# Patient Record
Sex: MALE
[De-identification: names, ages, dates, MRNs, and addresses within clinical notes are randomized per-mention and may not be internally consistent; named-entity substitution may affect disease eponyms.]

---

## 2019-03-24 ENCOUNTER — HOSPITAL ENCOUNTER (EMERGENCY)
Dept: HOSPITAL 97 - ER | Age: 34
Discharge: HOME | End: 2019-03-24
Payer: SELF-PAY

## 2019-03-24 DIAGNOSIS — H53.8: Primary | ICD-10-CM

## 2019-03-24 DIAGNOSIS — Z72.0: ICD-10-CM

## 2019-03-24 PROCEDURE — 70450 CT HEAD/BRAIN W/O DYE: CPT

## 2019-03-24 PROCEDURE — 99283 EMERGENCY DEPT VISIT LOW MDM: CPT

## 2019-03-24 NOTE — ER
Nurse's Notes                                                                                     

 [facility]                                                                 

Name: Navi Cotton                                                                              

Age: 34 yrs                                                                                       

Sex: Male                                                                                         

: 1985                                                                                   

MRN: [id number]                                                                                   

Arrival Date: 2019                                                                          

Time: 10:47                                                                                       

Account#: [id number]                                                                            

Bed Treatment                                                                                     

Private MD: None, None                                                                            

Diagnosis: Visual disturbances                                                                    

                                                                                                  

Presentation:                                                                                     

                                                                                             

11:04 Presenting complaint: Patient states: the night before last, i noticed half of my       hj  

      vision the R eye is covered with something, and its getting worse, my vision is limited     

      on my R eye; denies trauma to the area;. Transition of care: patient was not received       

      from another setting of care. Onset of symptoms was 2019. Risk Assessment: Do     

      you want to hurt yourself or someone else? Patient reports no desire to harm self or        

      others. Initial Sepsis Screen: Does the patient meet any 2 criteria? No. Patient's          

      initial sepsis screen is negative. Does the patient have a suspected source of              

      infection? No. Patient's initial sepsis screen is negative. Care prior to arrival: None.    

11:04 Method Of Arrival: Ambulatory                                                             

11:04 Acuity: VICKIE 4                                                                           hj  

                                                                                                  

Triage Assessment:                                                                                

11:07 General: Appears in no apparent distress. uncomfortable, Behavior is calm, cooperative, hj  

      appropriate for age. Pain: Denies pain.                                                     

                                                                                                  

Historical:                                                                                       

- Allergies:                                                                                      

11:06 Ibuprofen;                                                                              hj  

- Home Meds:                                                                                      

11:06 None [Active];                                                                          hj  

- PMHx:                                                                                           

11:06 None;                                                                                   hj  

- PSHx:                                                                                           

11:06 Appendectomy;                                                                           hj  

                                                                                                  

- Immunization history:: Adult Immunizations not up to date.                                      

- Social history:: Smoking status: Patient uses tobacco products, Patient/guardian                

  denies using alcohol.                                                                           

- Ebola Screening: : Patient negative for fever greater than or equal to 101.5 degrees            

  Fahrenheit, and additional compatible Ebola Virus Disease symptoms Patient denies               

  exposure to infectious person Patient denies travel to an Ebola-affected area in the            

  21 days before illness onset.                                                                   

                                                                                                  

                                                                                                  

Screenin:07 Abuse screen: Denies threats or abuse. Denies injuries from another. Nutritional        hj  

      screening: No deficits noted. Tuberculosis screening: No symptoms or risk factors           

      identified. Fall Risk None identified.                                                      

                                                                                                  

Assessment:                                                                                       

11:45 General: Appears in no apparent distress. comfortable, Behavior is calm, cooperative,   ss  

      Denies fever, feeling ill, fatigue, chills. Pain: Denies pain. Neuro: Level of              

      Consciousness is awake, alert, obeys commands, Oriented to person, place, time,             

      situation. Cardiovascular: Capillary refill < 3 seconds is brisk in bilateral fingers       

      Patient's skin is warm and dry. Respiratory: Airway is patent Respiratory effort is         

      even, unlabored, Respiratory pattern is regular, symmetrical. GI: No signs and/or           

      symptoms were reported involving the gastrointestinal system. : No signs and/or           

      symptoms were reported regarding the genitourinary system. EENT: Sclera/Cornea Nares        

      are clear Oral mucosa is moist. Throat is clear. EENT: Reports small black spot of          

      vision to R lower quadrant of visual field. Began 2 nights ago. . Derm: Skin is intact,     

      is healthy with good turgor, Skin is dry, Skin is pink, warm \T\ dry. normal.               

      Musculoskeletal: Circulation, motion, and sensation intact. Range of motion: intact in      

      all extremities.                                                                            

                                                                                                  

Vital Signs:                                                                                      

11:07  / 57; Pulse 64; Resp 18; Temp 98.1(O); Pulse Ox 100% on R/A; Weight 127.01 kg;   hj  

      Height 6 ft. 0 in. (182.88 cm); Pain 0/10;                                                  

11:07 Body Mass Index 37.98 (127.01 kg, 182.88 cm)                                            hj  

                                                                                                  

Visual Acuity:                                                                                    

12:01 Left Eye Visual acuity 20/20, Normal, React To Light, Reactive To Accomodation; Right   ss  

      Eye Visual acuity 20/20, Normal, React To Light, Reactive To Accomodation; Both Eyes        

      Visual acuity 20/20; With Lenses; Pt reports a small area of darkness in R lower            

      quadrant of visual field.                                                                   

                                                                                                  

ED Course:                                                                                        

10:47 Patient arrived in ED.                                                                  mr  

10:48 None, None is Private Physician.                                                        mr  

11:06 Triage completed.                                                                       hj  

11:07 Arm band placed on right wrist.                                                         hj  

11:07 Patient has correct armband on for positive identification. Placed in gown. Bed in low  hj  

      position. Call light in reach. Side rails up X 1.                                           

11:49 Alfonso Santana PA is PHCP.                                                                cp  

11:49 Alfonso Bauman MD is Attending Physician.                                             cp  

12:01 Haley Schultz, JOSEFA is Primary Nurse.                                                    ss  

12:18 CT Head Brain wo Cont In Process Unspecified.                                           EDMS

12:47 Cortez Fernandez MD is Referral Physician.                                              cp  

12:47 No provider procedures requiring assistance completed. Patient did not have IV access   ss  

      during this emergency room visit.                                                           

                                                                                                  

Administered Medications:                                                                         

No medications were administered                                                                  

                                                                                                  

                                                                                                  

Outcome:                                                                                          

12:48 Discharge ordered by MD.                                                                cp  

12:58 Discharged to home ambulatory.                                                          ss  

12:58 Condition: good                                                                             

12:58 Discharge instructions given to patient, Instructed on discharge instructions, follow       

      up and referral plans. medication usage, Demonstrated understanding of instructions,        

      follow-up care, medications.                                                                

12:58 Patient left the ED.                                                                    ss  

                                                                                                  

Signatures:                                                                                       

Dispatcher [employer]                           EDMS                                                 

Joana Alanis                                                   

Haley Schultz, RN                      RN                                                      

Kareem Bowen RN                      RN   hj                                                   

Alfonso Santana, PA                         PA   cp                                                   

                                                                                                  

Corrections: (The following items were deleted from the chart)                                    

11:10 11:07 Pulse 64bpm; Resp 18bpm; Pulse Ox 100% RA; Temp 98.1F Oral; 127.01 kg; Height 6   hj  

      ft. 0 in.; BMI: 37.9; Pain 0/10; hj                                                         

                                                                                                  

**************************************************************************************************

## 2019-03-24 NOTE — EDPHYS
Physician Documentation                                                                           

 [facility]                                                                 

Name: Navi Cottno                                                                              

Age: 34 yrs                                                                                       

Sex: Male                                                                                         

: 1985                                                                                   

MRN: [id number]                                                                                   

Arrival Date: 2019                                                                          

Time: 10:47                                                                                       

Account#: [id number]                                                                            

Bed Treatment                                                                                     

Private MD: None, None                                                                            

ED Physician Alfonso Bauman                                                                      

HPI:                                                                                              

                                                                                             

12:05 This 34 yrs old  Male presents to ER via Ambulatory with complaints of Vision   cp  

      Problem.                                                                                    

12:05 The patient is experiencing dark brown spot in visual field, to the right eye, caused   cp  

      by an unknown mechanism. Onset: The symptoms/episode began/occurred 2 day(s) ago.           

      Duration: the symptoms are continuous. Associated signs and symptoms: Pertinent             

      positives: headache last night that is now resolved, Pertinent negatives: dizziness,        

      ear ache, fever. Patient wears glasses. Severity of symptoms: in the emergency              

      department the symptoms are unchanged despite home interventions.                           

                                                                                                  

Historical:                                                                                       

- Allergies:                                                                                      

11:06 Ibuprofen;                                                                              hj  

- Home Meds:                                                                                      

11:06 None [Active];                                                                          hj  

- PMHx:                                                                                           

11:06 None;                                                                                   hj  

- PSHx:                                                                                           

11:06 Appendectomy;                                                                           hj  

                                                                                                  

- Immunization history:: Adult Immunizations not up to date.                                      

- Social history:: Smoking status: Patient uses tobacco products, Patient/guardian                

  denies using alcohol.                                                                           

- Ebola Screening: : Patient negative for fever greater than or equal to 101.5 degrees            

  Fahrenheit, and additional compatible Ebola Virus Disease symptoms Patient denies               

  exposure to infectious person Patient denies travel to an Ebola-affected area in the            

  21 days before illness onset.                                                                   

                                                                                                  

                                                                                                  

ROS:                                                                                              

12:12 Constitutional: Negative for body aches, chills, fever, poor PO intake.                 cp  

12:12 Eyes: Positive for visual disturbance, of the right eye, Negative for discharge, pain,  cp  

      redness.                                                                                    

12:12 ENT: Negative for drainage from ear(s), ear pain, sore throat, difficulty swallowing,       

      difficulty handling secretions.                                                             

12:12 Cardiovascular: Negative for chest pain, palpitations.                                      

12:12 Respiratory: Negative for cough, shortness of breath, wheezing.                             

12:12 Abdomen/GI: Negative for abdominal pain, nausea, vomiting, and diarrhea.                    

12:12 : Negative for urinary symptoms.                                                          

12:12 Skin: Negative for cellulitis, rash.                                                        

12:12 Neuro: Negative for altered mental status, dizziness, headache, weakness.                   

12:12 All other systems are negative.                                                             

                                                                                                  

Exam:                                                                                             

12:18 Constitutional: The patient appears in no acute distress, alert, awake, non-toxic, well cp  

      developed, well nourished.                                                                  

12:18 Head/Face:  Normocephalic, atraumatic.                                                  cp  

12:18 Eyes: Periorbital structures: appear normal, Pupils: equal, round, and reactive to          

      light and accomodation, Extraocular movements: intact throughout, Conjunctiva: normal,      

      no exudate, no injection, Corneas: are normal, Sclera: no appreciated abnormality,          

      Anterior chamber: normal, no hyphema, Lids and lashes: appear normal, bilaterally,          

      Visual fields: are intact.                                                                  

12:18 ENT: External ear(s): are unremarkable, Ear canal(s): are normal, clear, TM's: bulging,     

      is not appreciated, bilaterally, dullness, bilaterally, erythema, is not appreciated,       

      bilaterally, Nose: is normal, Mouth: is normal, Posterior pharynx: Airway: no evidence      

      of obstruction, patent.                                                                     

12:18 Neck: ROM/movement: is normal, is supple, without pain, no range of motions                 

      limitations, no nuchal rigidity.                                                            

12:18 Chest/axilla: Inspection: normal.                                                           

12:18 Cardiovascular: Rate: normal, Rhythm: regular.                                              

12:18 Respiratory: the patient does not display signs of respiratory distress,  Respirations:     

      normal, no use of accessory muscles, no retractions, no splinting, no tachypnea.            

12:18 Skin: cellulitis, is not appreciated, no rash present.                                      

12:18 Neuro: Orientation: to person, place \T\ time. Mentation: is normal, Cerebellar function:   

      is grossly normal, Motor: moves all fours, strength is normal, Sensation: is normal,        

      Gait: is steady.                                                                            

                                                                                                  

Vital Signs:                                                                                      

11:07  / 57; Pulse 64; Resp 18; Temp 98.1(O); Pulse Ox 100% on R/A; Weight 127.01 kg;   hj  

      Height 6 ft. 0 in. (182.88 cm); Pain 0/10;                                                  

11:07 Body Mass Index 37.98 (127.01 kg, 182.88 cm)                                              

                                                                                                  

Visual Acuity:                                                                                    

12:01 Left Eye Visual acuity 20/20, Normal, React To Light, Reactive To Accomodation; Right   ss  

      Eye Visual acuity 20/20, Normal, React To Light, Reactive To Accomodation; Both Eyes        

      Visual acuity 20/20; With Lenses; Pt reports a small area of darkness in R lower            

      quadrant of visual field.                                                                   

                                                                                                  

MDM:                                                                                              

11:49 Patient medically screened.                                                             cp  

12:00 Differential diagnosis: Corneal abrasion of right eye. Foreign body in right eye. CVA.  cp  

12:47 Data reviewed: vital signs, nurses notes, radiologic studies, CT scan.                    

12:47 Counseling: I had a detailed discussion with the patient and/or guardian regarding: the cp  

      historical points, exam findings, and any diagnostic results supporting the                 

      discharge/admit diagnosis, radiology results, the need for outpatient follow up, for        

      definitive care, an opthalmologist, to return to the emergency department if symptoms       

      worsen or persist or if there are any questions or concerns that arise at home. ED          

      course: VSS. Head CT negative for acute findings. Will discharge to home and recommend      

      f/u with ophthalmology.                                                                     

                                                                                                  

                                                                                             

11:57 Order name: CT Head Brain wo Cont; Complete Time: 12:44                                   

                                                                                             

11:58 Order name: Visual Acuity; Complete Time: 12:00                                         cp  

                                                                                                  

Administered Medications:                                                                         

No medications were administered                                                                  

                                                                                                  

                                                                                                  

Disposition:                                                                                      

13:30 Chart complete.                                                                           

                                                                                             

07:33 Co-signature as Attending Physician, Alfonso Bauman MD I agree with the assessment and  mateusz 

      plan of care.                                                                               

                                                                                                  

Disposition:                                                                                      

19 12:48 Discharged to Home. Impression: Visual disturbances.                               

- Condition is Stable.                                                                            

- Discharge Instructions: Visual Disturbances, Aspirin and Your Heart.                            

                                                                                                  

- Medication Reconciliation Form, Thank You Letter, Antibiotic Education, Prescription            

  Opioid Use form.                                                                                

- Work release form (19 21:53).                                                         am2 

- Follow up: Cortez Fernandez MD; When: Tomorrow; Reason: Recheck today's complaints.             

- Problem is new.                                                                                 

- Symptoms are unchanged.                                                                         

- Notes: take baby aspirin daily                                                                  

                                                                                                  

                                                                                                  

Signatures:                                                                                       

Dispatcher [employer]                           EDMS                                                 

Alfonso Bauman MD MD cha Smirch, Shelby, RN RN ss Joaquin, Henry, RN RN hj Page, Corey, PA PA                                                      

Kala Pyle                               am2                                                  

                                                                                                  

Corrections: (The following items were deleted from the chart)                                    

                                                                                             

12:58 12:48 2019 12:48 Discharged to Home. Impression: Visual disturbances. Condition   ss  

      is Stable. Forms are Medication Reconciliation Form, Thank You Letter, Antibiotic           

      Education, Prescription Opioid Use. Follow up: Cortez Fernandez; When: Tomorrow; Reason:      

      Recheck today's complaints. Problem is new. Symptoms are unchanged. cp                      

                                                                                                  

**************************************************************************************************

## 2019-03-24 NOTE — RAD REPORT
EXAM DESCRIPTION:  CT - Head Brain Wo Cont - 3/24/2019 12:18 pm

 

CLINICAL HISTORY:  VISUAL DISTURBANCES

Headache, drowsiness

 

COMPARISON:  <Comparisons>

 

TECHNIQUE:  All CT scans are performed using dose optimization technique as appropriate and may inclu
de automated exposure control or mA/KV adjustment according to patient size.

 

FINDINGS:  No intracranial hemorrhage, hydrocephalus or extra-axial fluid collection.No areas of brai
n edema or evidence of midline shift.

 

The paranasal sinuses and mastoids are clear. The calvarium is intact.

 

IMPRESSION:  No acute intracranial abnormality.

## 2021-06-09 ENCOUNTER — HOSPITAL ENCOUNTER (EMERGENCY)
Dept: HOSPITAL 97 - ER | Age: 36
Discharge: HOME | End: 2021-06-09
Payer: SELF-PAY

## 2021-06-09 VITALS — OXYGEN SATURATION: 99 % | TEMPERATURE: 97.5 F

## 2021-06-09 VITALS — SYSTOLIC BLOOD PRESSURE: 135 MMHG | DIASTOLIC BLOOD PRESSURE: 84 MMHG

## 2021-06-09 DIAGNOSIS — B27.90: Primary | ICD-10-CM

## 2021-06-09 LAB
ALBUMIN SERPL BCP-MCNC: 4 G/DL (ref 3.4–5)
ALP SERPL-CCNC: 81 U/L (ref 45–117)
ALT SERPL W P-5'-P-CCNC: 31 U/L (ref 12–78)
AST SERPL W P-5'-P-CCNC: 12 U/L (ref 15–37)
BUN BLD-MCNC: 13 MG/DL (ref 7–18)
GLUCOSE SERPLBLD-MCNC: 91 MG/DL (ref 74–106)
HCT VFR BLD CALC: 46.5 % (ref 39.6–49)
INR BLD: 0.95
LIPASE SERPL-CCNC: 147 U/L (ref 73–393)
LYMPHOCYTES # SPEC AUTO: 2.5 K/UL (ref 0.7–4.9)
METHAMPHET UR QL SCN: NEGATIVE
PMV BLD: 9.8 FL (ref 7.6–11.3)
POTASSIUM SERPL-SCNC: 3.7 MMOL/L (ref 3.5–5.1)
RBC # BLD: 5.22 M/UL (ref 4.33–5.43)
THC SERPL-MCNC: POSITIVE NG/ML

## 2021-06-09 PROCEDURE — 87070 CULTURE OTHR SPECIMN AEROBIC: CPT

## 2021-06-09 PROCEDURE — 87081 CULTURE SCREEN ONLY: CPT

## 2021-06-09 PROCEDURE — 99284 EMERGENCY DEPT VISIT MOD MDM: CPT

## 2021-06-09 PROCEDURE — 96375 TX/PRO/DX INJ NEW DRUG ADDON: CPT

## 2021-06-09 PROCEDURE — 85730 THROMBOPLASTIN TIME PARTIAL: CPT

## 2021-06-09 PROCEDURE — 85025 COMPLETE CBC W/AUTO DIFF WBC: CPT

## 2021-06-09 PROCEDURE — 81003 URINALYSIS AUTO W/O SCOPE: CPT

## 2021-06-09 PROCEDURE — 83690 ASSAY OF LIPASE: CPT

## 2021-06-09 PROCEDURE — 80048 BASIC METABOLIC PNL TOTAL CA: CPT

## 2021-06-09 PROCEDURE — 85610 PROTHROMBIN TIME: CPT

## 2021-06-09 PROCEDURE — 93005 ELECTROCARDIOGRAM TRACING: CPT

## 2021-06-09 PROCEDURE — 74177 CT ABD & PELVIS W/CONTRAST: CPT

## 2021-06-09 PROCEDURE — 36415 COLL VENOUS BLD VENIPUNCTURE: CPT

## 2021-06-09 PROCEDURE — 80076 HEPATIC FUNCTION PANEL: CPT

## 2021-06-09 PROCEDURE — 86308 HETEROPHILE ANTIBODY SCREEN: CPT

## 2021-06-09 PROCEDURE — 80307 DRUG TEST PRSMV CHEM ANLYZR: CPT

## 2021-06-09 PROCEDURE — 96374 THER/PROPH/DIAG INJ IV PUSH: CPT

## 2021-06-09 PROCEDURE — 96361 HYDRATE IV INFUSION ADD-ON: CPT

## 2021-06-09 NOTE — ER
Nurse's Notes                                                                                     

 [facility]                                                                 

Name: Navi Cotton                                                                              

Age: 36 yrs                                                                                       

Sex: Male                                                                                         

: 1985                                                                                   

MRN: [id number]                                                                                   

Arrival Date: 2021                                                                          

Time: 02:12                                                                                       

Account#: [id number]                                                                            

Bed 20                                                                                            

Private MD:                                                                                       

Diagnosis: Infectious mononucleosis, unspecified;Upper abdominal pain, unspecified                

                                                                                                  

Presentation:                                                                                     

                                                                                             

02:21 Chief complaint: Patient states: LUQ pain that began 3 days ago. PT also reports that   ss  

      he noticed small amount of maroon blood in his stool twice. Denies N/V/D. Coronavirus       

      screen: Client denies travel out of the [place] in the last 14 days. Ebola Screen: Patient     

      denies exposure to infectious person. Patient denies travel to an Ebola-affected area       

      in the 21 days before illness onset. Initial Sepsis Screen: Does the patient meet any 2     

      criteria? No. Patient's initial sepsis screen is negative. Does the patient have a          

      suspected source of infection? No. Patient's initial sepsis screen is negative. Risk        

      Assessment: Do you want to hurt yourself or someone else? Patient reports no desire to      

      harm self or others. Onset of symptoms was 2021.                                   

02:21 Method Of Arrival: Ambulatory                                                           ss  

02:21 Acuity: VICKIE 3                                                                           ss  

                                                                                                  

Historical:                                                                                       

- Allergies:                                                                                      

02:24 No Known Allergies;                                                                     ss  

- Home Meds:                                                                                      

02:24 None [Active];                                                                          ss  

- PMHx:                                                                                           

02:24 None;                                                                                   ss  

- PSHx:                                                                                           

02:24 Appendectomy;                                                                           ss  

                                                                                                  

- Immunization history:: Adult Immunizations up to date.                                          

- Social history:: Smoking status: Patient/guardian denies using tobacco, Stopped _               

  months ago .5 Patient/guardian denies using alcohol, street drugs.                              

                                                                                                  

                                                                                                  

Screenin:19 Abuse screen: Denies threats or abuse. Denies injuries from another. Nutritional        jm8 

      screening: No deficits noted. Tuberculosis screening: No symptoms or risk factors           

      identified. Fall Risk None identified.                                                      

                                                                                                  

Assessment:                                                                                       

02:35 General: Appears in no apparent distress. comfortable, Behavior is calm, cooperative,   jm8 

      appropriate for age. Pain: Complains of pain in abdomen Pain currently is 8 out of 10       

      on a pain scale. Quality of pain is described as crampy, Pain began 2-3 days ago.           

      Aggravated by exercise, increased activity, repositioning.                                  

02:35 Neuro: No deficits noted. Level of Consciousness is awake, alert, obeys commands,       jm8 

      Oriented to person, place, time. Cardiovascular: No deficits noted. Respiratory:            

      Reports sore throat Airway is patent Trachea midline Respiratory effort is even,            

      unlabored, Respiratory pattern is regular, symmetrical. GI: Reports lower abdominal         

      pain, upper abdominal pain, cramping, diarrhea, rectal bleeding. GI: Bowel sounds           

      present X 4 quads. Abd is soft Abdomen is tender to palpation in left upper quadrant.       

      : No deficits noted. No signs and/or symptoms were reported regarding the                 

      genitourinary system. EENT: No deficits noted. No signs and/or symptoms were reported       

      regarding the EENT system. Derm: No deficits noted. No signs and/or symptoms reported       

      regarding the dermatologic system. Musculoskeletal: No deficits noted. No signs and/or      

      symptoms reported regarding the musculoskeletal system.                                     

                                                                                                  

Vital Signs:                                                                                      

02:21  / 90; Pulse 59; Resp 16; Temp 97.5(TE); Pulse Ox 99% on R/A; Weight 114.76 kg;     

      Height 6 ft. 0 in. (182.88 cm); Pain 8/10;                                                  

04:47  / 84; Pulse 54; Resp 16; Pulse Ox 99% on R/A;                                    jm8 

02:21 Body Mass Index 34.31 (114.76 kg, 182.88 cm)                                              

                                                                                                  

ED Course:                                                                                        

02:12 Patient arrived in ED.                                                                  am4 

02:16 Kyle Linton MD is Attending Physician.                                             [facility] 

02:20 Patient has correct armband on for positive identification. Bed in low position. Call   jm8 

      light in reach. Side rails up X2. Adult w/ patient.                                         

02:23 Triage completed.                                                                       ss  

02:24 Arm band placed on right wrist.                                                           

02:53 Inserted saline lock: 20 gauge in right antecubital area, using aseptic technique.      jm8 

03:54 CT Abd/Pelvis - IV Contrast Only In Process Unspecified.                                EDMS

04:48 No provider procedures requiring assistance completed. IV discontinued, intact,         jm8 

      bleeding controlled.                                                                        

                                                                                                  

Administered Medications:                                                                         

02:52 Drug: NS 0.9% 1000 ml Route: IV; Rate: 1000 ml; Site: right antecubital;                jm8 

04:49 Follow up: Response: No adverse reaction; IV Status: Completed infusion                 jm8 

02:52 Drug: morphine 4 mg Route: IVP; Site: right antecubital;                                jm8 

04:49 Follow up: Response: No adverse reaction                                                8 

02:52 Drug: Zofran (Ondansetron) 4 mg Route: IVP; Site: right antecubital;                    jm8 

04:49 Follow up: Response: No adverse reaction                                                jm8 

                                                                                                  

                                                                                                  

Point of Care Testing:                                                                            

      Guaiac:                                                                                     

02:53 Stool Guaiac: Negative; Stool Hemoccult Control: Pass;                                  jm8 

Outcome:                                                                                          

04:35 Discharge ordered by MD.                                                                caren 

04:48 Discharged to home ambulatory.                                                          jm8 

04:48 Condition: good                                                                             

04:48 Discharge instructions given to patient, Instructed on discharge instructions, follow       

      up and referral plans. medication usage, Demonstrated understanding of instructions,        

      follow-up care, medications.                                                                

04:50 Patient left the ED.                                                                    jm8 

                                                                                                  

Signatures:                                                                                       

Dispatcher [employer]                           EDMS                                                 

Haley Schultz RN                      Kyle Shukla MD MD mh7 Martinez, Ashley am4 Malcaba, Joseph, RN RN   jm8                                                  

                                                                                                  

Corrections: (The following items were deleted from the chart)                                    

02:24 02:21 Social history: Smoking status: Patient denies any tobacco usage or history of. ssss  

02:37 02:35 Pain: Complains of pain in abdomen Pain currently is 5 out of 10 on a pain scale. jm8 

      jm8                                                                                         

                                                                                                  

**************************************************************************************************

## 2021-06-09 NOTE — EKG
Test Date:    2021-06-09               Test Time:    02:49:49

Technician:                                          

                                                     

MEASUREMENT RESULTS:                                       

Intervals:                                           

Rate:         60                                     

LA:           180                                    

QRSD:         104                                    

QT:           390                                    

QTc:          390                                    

Axis:                                                

P:            45                                     

LA:           180                                    

QRS:          -8                                     

T:            15                                     

                                                     

INTERPRETIVE STATEMENTS:                                       

                                                     

Normal sinus rhythm

Possible Left atrial enlargement

Incomplete right bundle branch block

Left ventricular hypertrophy

Nonspecific T wave abnormality

Abnormal ECG

No previous ECG available for comparison



Electronically Signed On 06-09-21 15:59:57 CDT by Dharmesh Sandoval

## 2021-06-09 NOTE — RAD REPORT
EXAM DESCRIPTION:  CT - Abdomen   Pelvis W Contrast - 6/9/2021 6:42 am

 

COMPARISON:  None.

 

CLINICAL HISTORY:  ABD PAIN

 

TECHNIQUE:  CT of the abdomen and pelvis was acquired with   IV contrast material. Coronal and sagitt
al reconstructions were obtained.   Automated exposure control was utilized on this examination as a 
dose lowering technique.

 

FINDINGS:  Lung bases: Clear.

Liver: Normal.

Gallbladder and biliary: Normal gallbladder. Unremarkable biliary tree.

Pancreas: Normal.

Spleen: Normal.

Adrenal glands: Normal adrenal glands.

Kidneys: Normal kidneys

Stomach and Small Bowel: The stomach and small bowel are normal.

Urinary bladder: Normal.

Prostate/Male Urogenital: Normal.

Colon and Appendix: There is focal fat stranding adjacent to the descending colon. No evidence of kely
endicitis.

Retroperitoneum and lymph nodes: Normal.

Vascular: Normal.

Peritoneal cavity: No ascites or free air.

Musculoskeletal and soft tissues: There is a small fat-containing right inguinal hernia. No aggressiv
e bone lesions.   No compression fracture.

 

IMPRESSION:  Mild focal fat stranding adjacent to the descending colon are favored to represent epipl
oic appendagitis.

 

Electronically signed by:   Carlitos Townsend MD   6/9/2021 4:08 AM CDT Workstation: [id number]

 

 

Due to temporary technical issues with the PACS/Fluency reporting system, reports are being signed by
 the in house radiologist without review as a courtesy to ensure prompt reporting. The interpreting r
adiologist is fully responsible for the content of the report.

## 2023-11-05 NOTE — EDPHYS
Problem: Adult Inpatient Plan of Care  Goal: Optimal Comfort and Wellbeing  Outcome: Progressing   Goal Outcome Evaluation:  Pt A&Ox4, she is able to make her needs known.  Provided PRN Zofran due to nausea upon arriving to this unit.  Colostomy patent, pt will request help with emptying when she needs it.  She states at home she usually sits in front of the toilet and she empties it herself, but being as she is in the hospital she will likely need assistance.  Pt has not urinated since arriving on the floor, she is aware she needs a UA/UC, label and collection cup are on the counter for the sample.   Physician Documentation                                                                           

 [facility]                                                                 

Name: Navi Cotton                                                                              

Age: 36 yrs                                                                                       

Sex: Male                                                                                         

: 1985                                                                                   

MRN: [id number]                                                                                   

Arrival Date: 2021                                                                          

Time: 02:12                                                                                       

Account#: [id number]                                                                            

Bed 20                                                                                            

Private MD:                                                                                       

ED Physician Kyle Linton                                                                      

HPI:                                                                                              

                                                                                             

02:39 This 36 yrs old  Male presents to ER via Ambulatory with complaints of          mh7 

      Abdominal Pain.                                                                             

02:39 The patient presents with abdominal pain in the left upper quadrant. Onset: The         mh7 

      symptoms/episode began/occurred 4 day(s) ago. The symptoms do not radiate. Associated       

      signs and symptoms: Pertinent positives: blood in stools, two times, last was 2 days        

      ago, Pertinent negatives: nausea, vomiting, and diarrhea, nausea and vomiting,              

      anorexia, chest pain, constipation, diarrhea, dysuria, fever, headache, hematuria,          

      nausea, palpitations, shortness of breath, testicular pain, vomiting, vomiting blood.       

      The symptoms are described as intermittent, vague, waxing/waning. Modifying factors:        

      The symptoms are alleviated by nothing, the symptoms are aggravated by nothing.             

      Severity of pain: At its worst the pain was moderate 3 day(s) ago, in the emergency         

      department the pain is unchanged.                                                           

                                                                                                  

Historical:                                                                                       

- Allergies:                                                                                      

02:24 No Known Allergies;                                                                     ss  

- Home Meds:                                                                                      

02:24 None [Active];                                                                          ss  

- PMHx:                                                                                           

02:24 None;                                                                                   ss  

- PSHx:                                                                                           

02:24 Appendectomy;                                                                           ss  

                                                                                                  

- Immunization history:: Adult Immunizations up to date.                                          

- Social history:: Smoking status: Patient/guardian denies using tobacco, Stopped _               

  months ago .5 Patient/guardian denies using alcohol, street drugs.                              

                                                                                                  

                                                                                                  

ROS:                                                                                              

02:39 Constitutional: Negative for fever, chills, and weight loss, Eyes: Negative for injury, mh7 

      pain, redness, and discharge.                                                               

02:39 Neck: Negative for injury, pain, and swelling, Cardiovascular: Negative for chest pain,     

      palpitations, and edema, Respiratory: Negative for shortness of breath, cough,              

      wheezing, and pleuritic chest pain, Back: Negative for injury and pain, : Negative        

      for injury, bleeding, discharge, and swelling, MS/Extremity: Negative for injury and        

      deformity, Skin: Negative for injury, rash, and discoloration, Neuro: Negative for          

      headache, weakness, numbness, tingling, and seizure, Psych: Negative for depression,        

      anxiety, suicide ideation, homicidal ideation, and hallucinations, Allergy/Immunology:      

      Negative for hives, rash, and allergies, Endocrine: Negative for neck swelling,             

      polydipsia, polyuria, polyphagia, and marked weight changes, Hematologic/Lymphatic:         

      Negative for swollen nodes, abnormal bleeding, and unusual bruising.                        

02:39 ENT: Positive for sore throat.                                                              

                                                                                                  

Exam:                                                                                             

02:39 Constitutional:  This is a well developed, well nourished patient who is awake, alert,  mh7 

      and in no acute distress. Head/Face:  Normocephalic, atraumatic. Eyes:  Pupils equal        

      round and reactive to light, extra-ocular motions intact.  Lids and lashes normal.          

      Conjunctiva and sclera are non-icteric and not injected.  Cornea within normal limits.      

      Periorbital areas with no swelling, redness, or edema.                                      

02:39 Neck:  Trachea midline, no thyromegaly or masses palpated, and no cervical                  

      lymphadenopathy.  Supple, full range of motion without nuchal rigidity, or vertebral        

      point tenderness.  No Meningismus. Chest/axilla:  Normal chest wall appearance and          

      motion.  Nontender with no deformity.  No lesions are appreciated. Cardiovascular:          

      Regular rate and rhythm with a normal S1 and S2.  No gallops, murmurs, or rubs.  Normal     

      PMI, no JVD.  No pulse deficits. Respiratory:  Lungs have equal breath sounds               

      bilaterally, clear to auscultation and percussion.  No rales, rhonchi or wheezes noted.     

       No increased work of breathing, no retractions or nasal flaring.                           

02:39 Back:  No spinal tenderness.  No costovertebral tenderness.  Full range of motion.          

      Skin:  Warm, dry with normal turgor.  Normal color with no rashes, no lesions, and no       

      evidence of cellulitis. MS/ Extremity:  Pulses equal, no cyanosis.  Neurovascular           

      intact.  Full, normal range of motion. Neuro:  Awake and alert, GCS 15, oriented to         

      person, place, time, and situation.  Cranial nerves II-XII grossly intact.  Motor           

      strength 5/5 in all extremities.  Sensory grossly intact.  Cerebellar exam normal.          

      Normal gait. Psych:  Awake, alert, with orientation to person, place and time.              

      Behavior, mood, and affect are within normal limits.                                        

02:39 ENT: Mouth: is normal, Posterior pharynx: Airway: normal, Tonsils: are normal in            

      appearance, Uvula: normal, swelling, is not appreciated, erythema, that is moderate,        

      exudate, is not appreciated, peritonsillar mass, is not appreciated, pooling of             

      secretions, is not appreciated, Dental exam: normal, Voice: is normal.                      

02:39 Abdomen/GI: Inspection: abdomen appears normal, Bowel sounds: normal, in all quadrants,     

      Palpation: moderate abdominal tenderness, in the left upper quadrant, mass, is not          

      appreciated, rebound tenderness, is not appreciated, voluntary guarding, is not             

      appreciated, involuntary guarding, is not appreciated, no appreciated organomegaly,         

      Rectal exam: is unremarkable, Prostate: normal, rectal tone normal, Stool: brown,           

      guaiac negative, hemorrhoid(s), are not appreciated, mass, is not appreciated,              

      swelling, is not appreciated, tenderness, is not appreciated, fecal impaction, is not       

      appreciated, the exam is chaperoned by the nurse, Indicators: McBurney's point is not       

      tender, Vargas's sign is negative, Rovsing's sign is negative, Obturator sign is            

      negative, Psoas sign is negative, Liver: no appreciated palpable abnormalities, Hernia:     

      not appreciated.                                                                            

                                                                                                  

Vital Signs:                                                                                      

02:21  / 90; Pulse 59; Resp 16; Temp 97.5(TE); Pulse Ox 99% on R/A; Weight 114.76 kg;   ss  

      Height 6 ft. 0 in. (182.88 cm); Pain 8/10;                                                  

04:47  / 84; Pulse 54; Resp 16; Pulse Ox 99% on R/A;                                    jm8 

02:21 Body Mass Index 34.31 (114.76 kg, 182.88 cm)                                            ss  

                                                                                                  

MDM:                                                                                              

04:32 Differential diagnosis: bowel obstruction, diverticulitis, gastritis, gastroesophageal  mh7 

      reflux disease, GI Bleed, non-specific abd pain, pancreatitis, Peptic Ulcer Disease,        

      Perf. Duodenal Ulcer, Perf. Gastric Ulcer, Pyelonephritis, Ureterolithiasis, urinary        

      tract infection. Data reviewed: vital signs, nurses notes, lab test result(s), CBC,         

      electrolytes, urinalysis, urine drug screen, EKG, radiologic studies, CT scan. Data         

      interpreted: Pulse oximetry: on room air is 99 %. Interpretation: normal. Counseling: I     

      had a detailed discussion with the patient and/or guardian regarding: the historical        

      points, exam findings, and any diagnostic results supporting the discharge/admit            

      diagnosis, the presence of at least one elevated blood pressure reading (>120/80)           

      during this emergency department visit, lab results, radiology results, the need for        

      outpatient follow up, to return to the emergency department if symptoms worsen or           

      persist or if there are any questions or concerns that arise at home. Response to           

      treatment: the patient's symptoms have resolved after treatment, the patient's blood        

      pressure is in an acceptable range, mental status has returned to baseline, the patient     

      no longer shows bradycardia, the patient is not short of breath, the patient is not         

      tachycardic, the patient's pain is gone, the patient's temperature has normalized,          

      patient is well hydrated.                                                                   

04:35 Patient medically screened.                                                                                                                                                          

02:38 Order name: Basic Metabolic Panel                                                                                                                                                    

02:38 Order name: CBC with Diff; Complete Time: 03:10                                                                                                                                      

02:38 Order name: Hepatic Function; Complete Time: 03:24                                                                                                                                   

02:38 Order name: Lipase; Complete Time: 03:24                                                                                                                                             

02:38 Order name: UDS; Complete Time: 03:24                                                                                                                                                

02:38 Order name: Rapid Strep; Complete Time: 03:10                                                                                                                                        

02:38 Order name: Mono Screen Profile; Complete Time: 03:10                                                                                                                                

02:38 Order name: Protime (+inr); Complete Time: 03:10                                                                                                                                     

02:38 Order name: Ptt, Activated; Complete Time: 03:10                                                                                                                                     

02:38 Order name: CT Abd/Pelvis - IV Contrast Only                                                                                                                                         

02:38 Order name: Basic Metabolic Panel; Complete Time: 03:24                                 [facility]

                                                                                             

02:50 Order name: Urine Dipstick-Ancillary; Complete Time: 03:00                              [facility]

                                                                                             

03:06 Order name: Throat Culture                                                              [facility]

                                                                                             

02:38 Order name: IV Saline Lock; Complete Time: 02:52                                                                                                                                     

02:38 Order name: Labs collected and sent; Complete Time: 02:52                                                                                                                            

02:38 Order name: Urine Dipstick-Ancillary (obtain specimen); Complete Time: 02:51                                                                                                         

02:38 Order name: EKG - Nurse/Tech; Complete Time: 02:52                                      [facility] 

                                                                                                  

Administered Medications:                                                                         

02:52 Drug: NS 0.9% 1000 ml Route: IV; Rate: 1000 ml; Site: right antecubital;                [facility] 

04:49 Follow up: Response: No adverse reaction; IV Status: Completed infusion                 [facility] 

02:52 Drug: morphine 4 mg Route: IVP; Site: right antecubital;                                8 

04:49 Follow up: Response: No adverse reaction                                                [facility] 

02:52 Drug: Zofran (Ondansetron) 4 mg Route: IVP; Site: right antecubital;                    8 

04:49 Follow up: Response: No adverse reaction                                                [facility] 

                                                                                                  

                                                                                                  

Point of Care Testing:                                                                            

      Guaiac:                                                                                     

02:53 Stool Guaiac: Negative; Stool Hemoccult Control: Pass;                                  [facility] 

Disposition:                                                                                      

21 04:35 Discharged to Home. Impression: Infectious mononucleosis, unspecified, Upper       

  abdominal pain, unspecified.                                                                    

- Condition is Stable.                                                                            

- Discharge Instructions: Abdominal Pain, Adult, Easy-to-Read, Infectious                         

  Mononucleosis, Easy-to-Read.                                                                    

                                                                                                  

- Medication Reconciliation Form, Thank You Letter, Antibiotic Education, Prescription            

  Opioid Use form.                                                                                

- Follow up: Private Physician; When: 1 - 2 days; Reason: Worsening of condition,                 

  Recheck today's complaints, Continuance of care, Re-evaluation by your physician.               

- Problem is new.                                                                                 

- Symptoms have improved.                                                                         

                                                                                                  

                                                                                                  

                                                                                                  

Signatures:                                                                                       

Dispatcher [employer]                           EDMS                                                 

Haley Schultz RN RN ss Holmes, Maurice, MD MD   mh7                                                  

Collin Cárdenas RN                     RN   jm8                                                  

                                                                                                  

Corrections: (The following items were deleted from the chart)                                    

02:24 02:21 Social history: Smoking status: Patient denies any tobacco usage or history of. ssss  

04:50 04:35 2021 04:35 Discharged to Home. Impression: Infectious mononucleosis,        jm8 

      unspecified; Upper abdominal pain, unspecified. Condition is Stable. Forms are              

      Medication Reconciliation Form, Thank You Letter, Antibiotic Education, Prescription        

      Opioid Use. Follow up: Private Physician; When: 1 - 2 days; Reason: Worsening of            

      condition, Recheck today's complaints, Continuance of care, Re-evaluation by your           

      physician. Problem is new. Symptoms have improved. [facility]                                      

                                                                                                  

**************************************************************************************************